# Patient Record
Sex: MALE | Race: WHITE | NOT HISPANIC OR LATINO | URBAN - NONMETROPOLITAN AREA
[De-identification: names, ages, dates, MRNs, and addresses within clinical notes are randomized per-mention and may not be internally consistent; named-entity substitution may affect disease eponyms.]

---
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
## 2022-07-15 ENCOUNTER — HOSPITAL ENCOUNTER (EMERGENCY)
Facility: HOSPITAL | Age: 22
Discharge: HOME | End: 2022-07-15
Attending: EMERGENCY MEDICINE | Admitting: EMERGENCY MEDICINE

## 2022-07-15 VITALS
HEIGHT: 67 IN | WEIGHT: 133 LBS | DIASTOLIC BLOOD PRESSURE: 77 MMHG | TEMPERATURE: 97.7 F | HEART RATE: 82 BPM | BODY MASS INDEX: 20.88 KG/M2 | SYSTOLIC BLOOD PRESSURE: 113 MMHG | OXYGEN SATURATION: 96 % | RESPIRATION RATE: 16 BRPM

## 2022-07-15 DIAGNOSIS — S60.10XA SUBUNGUAL HEMATOMA OF DIGIT OF HAND, INITIAL ENCOUNTER: Primary | ICD-10-CM

## 2022-07-15 PROCEDURE — 99282 EMERGENCY DEPT VISIT SF MDM: CPT

## 2022-07-15 NOTE — ED PROVIDER NOTES
Subjective   21-year-old male presents to the ED with a chief complaint of left middle finger.  Patient complaining of pain of the distal aspect of the left middle finger.  Patient was seen and evaluated at an urgent care yesterday.  He believes that he may have smashed his finger but is unsure.  Urgent care x-rays were negative yesterday.  Still that he had a sprain.  He does not actually remember smashing his finger but today he noticed some bruising under the distal aspect of the tip of his left middle finger nail.  No repeat injury.  Still having pain.  Small amount of swelling.  No redness warmth or erythema.  No other complaints at this time.          Review of Systems   Musculoskeletal: Positive for arthralgias.        Left middle finger     All other systems reviewed and are negative.      History reviewed. No pertinent past medical history.    No Known Allergies    Past Surgical History:   Procedure Laterality Date   • EAR TUBES  2008       Family History   Problem Relation Age of Onset   • No Known Problems Mother    • No Known Problems Father        Social History     Socioeconomic History   • Marital status: Single   Tobacco Use   • Smoking status: Current Some Day Smoker     Types: Cigarettes   • Smokeless tobacco: Never Used   • Tobacco comment: occasionally, when drinking   Vaping Use   • Vaping Use: Every day   • Substances: Nicotine, Flavoring   • Devices: Disposable   Substance and Sexual Activity   • Alcohol use: Yes     Comment: occasionally   • Drug use: Never   • Sexual activity: Defer           Objective   Physical Exam  Vitals and nursing note reviewed.   Constitutional:       General: He is not in acute distress.     Appearance: He is well-developed. He is not diaphoretic.   HENT:      Head: Normocephalic and atraumatic.      Nose: Nose normal.   Cardiovascular:      Rate and Rhythm: Normal rate and regular rhythm.      Pulses: Normal pulses.   Pulmonary:      Effort: Pulmonary effort is  normal. No respiratory distress.      Breath sounds: Normal breath sounds.   Abdominal:      General: There is no distension.      Palpations: Abdomen is soft.      Tenderness: There is no abdominal tenderness.   Musculoskeletal:         General: No deformity.   Skin:     Comments: Small subungal hematoma distally of left middle finger nail, less than 1/3 entirety of nail   Neurological:      Mental Status: He is alert and oriented to person, place, and time.      Cranial Nerves: No cranial nerve deficit.      Coordination: Coordination normal.         Procedures           ED Course                                           MDM  Physical exam shows a very small subungual hematoma  Not likely to benefit from trephination as it involves the distal aspect of the nail and pad of the finger.  No signs of infection.  Appropriate for discharge with symptomatic management.      Final diagnoses:   Subungual hematoma of digit of hand, initial encounter       ED Disposition  ED Disposition     ED Disposition   Discharge    Condition   Stable    Comment   --             PATIENT CONNECTION - [facility] [id number] [phone]             Medication List      No changes were made to your prescriptions during this visit.          David Cruz, DO  07/15/22 0250